# Patient Record
Sex: MALE | Race: BLACK OR AFRICAN AMERICAN | NOT HISPANIC OR LATINO | URBAN - METROPOLITAN AREA
[De-identification: names, ages, dates, MRNs, and addresses within clinical notes are randomized per-mention and may not be internally consistent; named-entity substitution may affect disease eponyms.]

---

## 2022-04-10 ENCOUNTER — HISTORICAL (OUTPATIENT)
Dept: ADMINISTRATIVE | Facility: HOSPITAL | Age: 42
End: 2022-04-10
Payer: MEDICAID

## 2022-04-19 DIAGNOSIS — K76.0 HEPATIC STEATOSIS: Primary | ICD-10-CM

## 2022-04-29 VITALS
WEIGHT: 264.13 LBS | OXYGEN SATURATION: 100 % | BODY MASS INDEX: 36.98 KG/M2 | DIASTOLIC BLOOD PRESSURE: 80 MMHG | SYSTOLIC BLOOD PRESSURE: 118 MMHG | HEIGHT: 71 IN

## 2022-05-10 ENCOUNTER — TELEPHONE (OUTPATIENT)
Dept: GASTROENTEROLOGY | Facility: CLINIC | Age: 42
End: 2022-05-10

## 2022-05-10 NOTE — TELEPHONE ENCOUNTER
----- Message from Rita Stone sent at 5/10/2022  2:13 PM CDT -----  Pt marc Dinora called to get ultrasound order sent to [facility] in [place] which is closer to where they live. [phone]

## 2022-05-11 ENCOUNTER — TELEPHONE (OUTPATIENT)
Dept: GASTROENTEROLOGY | Facility: CLINIC | Age: 42
End: 2022-05-11
Payer: MEDICAID

## 2022-08-01 ENCOUNTER — TELEPHONE (OUTPATIENT)
Dept: GASTROENTEROLOGY | Facility: CLINIC | Age: 42
End: 2022-08-01
Payer: MEDICAID

## 2022-08-01 NOTE — TELEPHONE ENCOUNTER
----- Message from Shruti Haynes sent at 7/29/2022  1:17 PM CDT -----  Patient's mother called regarding ultrasound orders to be faxed to [facility] in [facility].    Please refax orders to facility and mother also has questions regarding an appointment w/Tessy.    No appointment currently scheduled in Epic     Please advise     [phone]

## 2022-08-11 ENCOUNTER — OFFICE VISIT (OUTPATIENT)
Dept: GASTROENTEROLOGY | Facility: CLINIC | Age: 42
End: 2022-08-11
Payer: MEDICAID

## 2022-08-11 DIAGNOSIS — Z72.0 TOBACCO USER: ICD-10-CM

## 2022-08-11 DIAGNOSIS — K76.0 HEPATIC STEATOSIS: ICD-10-CM

## 2022-08-11 DIAGNOSIS — R79.89 ELEVATED LFTS: Primary | ICD-10-CM

## 2022-08-11 PROCEDURE — 4010F PR ACE/ARB THEARPY RXD/TAKEN: ICD-10-PCS | Mod: CPTII,95,, | Performed by: NURSE PRACTITIONER

## 2022-08-11 PROCEDURE — 4010F ACE/ARB THERAPY RXD/TAKEN: CPT | Mod: CPTII,95,, | Performed by: NURSE PRACTITIONER

## 2022-08-11 PROCEDURE — 1159F PR MEDICATION LIST DOCUMENTED IN MEDICAL RECORD: ICD-10-PCS | Mod: CPTII,95,, | Performed by: NURSE PRACTITIONER

## 2022-08-11 PROCEDURE — 1159F MED LIST DOCD IN RCRD: CPT | Mod: CPTII,95,, | Performed by: NURSE PRACTITIONER

## 2022-08-11 PROCEDURE — 1160F PR REVIEW ALL MEDS BY PRESCRIBER/CLIN PHARMACIST DOCUMENTED: ICD-10-PCS | Mod: CPTII,95,, | Performed by: NURSE PRACTITIONER

## 2022-08-11 PROCEDURE — 99214 OFFICE O/P EST MOD 30 MIN: CPT | Mod: 95,,, | Performed by: NURSE PRACTITIONER

## 2022-08-11 PROCEDURE — 99214 PR OFFICE/OUTPT VISIT, EST, LEVL IV, 30-39 MIN: ICD-10-PCS | Mod: 95,,, | Performed by: NURSE PRACTITIONER

## 2022-08-11 PROCEDURE — 1160F RVW MEDS BY RX/DR IN RCRD: CPT | Mod: CPTII,95,, | Performed by: NURSE PRACTITIONER

## 2022-08-11 RX ORDER — BUDESONIDE AND FORMOTEROL FUMARATE DIHYDRATE 160; 4.5 UG/1; UG/1
2 AEROSOL RESPIRATORY (INHALATION) EVERY 12 HOURS
COMMUNITY

## 2022-08-11 RX ORDER — OMEPRAZOLE 40 MG/1
40 CAPSULE, DELAYED RELEASE ORAL DAILY
COMMUNITY
Start: 2022-08-03

## 2022-08-11 RX ORDER — VITAMIN A ACETATE, .ALPHA.-TOCOPHEROL ACETATE, ASCORBIC ACID, THIAMINE MONONITRATE, RIBOFLAVIN, NIACINAMIDE, PYRIDOXINE HYDROCHLORIDE, BIOTIN, CALCIUM PANTOTHENATE, FOLIC ACID, CYANOCOBALAMIN, FLAVONE, FERROUS FUMARATE, CHROMIC CHLORIDE CR-51, MAGNESIUM OXIDE, MANGANESE GLUCONATE, CUPRIC OXIDE, SELENOMETHIONINE, AND ZINC OXIDE 2000; 30; 500; 20; 20; 100; 25; 150; 25; 1; 50; 50; 27; .1; 50; 5; 3; 50; 22.5 [IU]/1; [IU]/1; MG/1; MG/1; MG/1; MG/1; MG/1; UG/1; MG/1; MG/1; UG/1; MG/1; MG/1; MG/1; MG/1; MG/1; MG/1; UG/1; MG/1
TABLET, COATED ORAL
COMMUNITY
Start: 2022-03-19 | End: 2023-02-17

## 2022-08-11 RX ORDER — SIMVASTATIN 40 MG/1
40 TABLET, FILM COATED ORAL NIGHTLY
COMMUNITY

## 2022-08-11 RX ORDER — SOD CHLOR,BICARB/SQUEEZ BOTTLE
PACKET, WITH RINSE DEVICE NASAL
COMMUNITY
Start: 2022-03-15

## 2022-08-11 RX ORDER — NYSTATIN 100000 U/G
CREAM TOPICAL
COMMUNITY
End: 2023-02-17

## 2022-08-11 RX ORDER — MONTELUKAST SODIUM 10 MG/1
10 TABLET ORAL DAILY
COMMUNITY
Start: 2022-05-18 | End: 2023-02-17

## 2022-08-11 RX ORDER — FLUTICASONE PROPIONATE 50 MCG
2 SPRAY, SUSPENSION (ML) NASAL DAILY
COMMUNITY
Start: 2022-03-04

## 2022-08-11 RX ORDER — CALCIUM CITRATE/VITAMIN D3 200MG-6.25
TABLET ORAL
COMMUNITY
Start: 2022-02-21

## 2022-08-11 RX ORDER — TRIAMTERENE CAPSULES 50 MG/1
50 CAPSULE ORAL DAILY
COMMUNITY

## 2022-08-11 RX ORDER — PAROXETINE 10 MG/1
10 TABLET, FILM COATED ORAL DAILY
COMMUNITY
Start: 2022-01-28

## 2022-08-11 RX ORDER — LEVOCETIRIZINE DIHYDROCHLORIDE 5 MG/1
5 TABLET, FILM COATED ORAL NIGHTLY
COMMUNITY
Start: 2022-07-25

## 2022-08-11 RX ORDER — IBUPROFEN 800 MG/1
800 TABLET ORAL 3 TIMES DAILY PRN
COMMUNITY
Start: 2022-04-30

## 2022-08-11 NOTE — PROGRESS NOTES
Subjective:       Patient ID: Marvin Mckenna is a 41 y.o. male.    Chief Complaint: Hepatic Steatosis (No complaints)    This is a telemedicine note.     I have reviewed the patient's name and date of birth.  I have verbally reviewed the past medical history, active medication list, and allergies with the patient and verified with picture ID if applicable .  I have verified that this patient is in the state of [place].    Patient was treated using telemedicine, real-time audio (video unavailable).  I, distant provider, conducted the visit from [facility] in [place], [place].  The patient participated in the visit at a non-[facility] location. Patient was located at: home. I am licensed in the state where the patient stated they are located.  The patient stated that they understood and accepted the privacy and security risks to their information in their location.    Verbal consent to participate in interactive audio visit was obtained. This particular visit occurred during 7955-2766 COVID19 outbreak. I discussed with the patient regarding the nature of our telehealth visits, that:      - Our sessions are not being recorded and that personal health information is protected    - I would evaluate the patient and recommend diagnostics and treatments based on my assessment   - The team will provide follow up care in person if/when the patient needs it.    Total time spent with patient was 25 minutes, over 50% of which was used for education and counseling regarding medical conditions, current medications including risk/benefit and side effects/adverse events, over-the-counter medication uses/doses, home self-care and contact precautions, and red flags and indications for immediate medical attention.  The patient is receptive, expresses understanding and is agreeable to plan.  All questions answered and concerns addressed.      This 41-year-old  male with a history of tobacco  use, CVA, hyperlipidemia, depression, and asthma is being evaluated for a follow-up visit via telemedicine visit.  He was initially seen July 30, 2021, referred for fatty liver at that time.  He reported doing wellness labs with his PCP and being told that his liver enzymes were elevated.  Abdominal ultrasound June 17, 2021 revealed mild hepatomegaly with diffuse fatty infiltration of the liver and otherwise normal exam.  Acute viral hepatitis panel was negative June 25, 2021.    He reported feeling well and denied nocturnal symptoms, appetite changes, unintentional weight loss, fever, chills, nausea, vomiting, hematemesis, odynophagia, dysphagia, acid reflux, heartburn, early satiety, or abdominal pain.  Bowel habits were described as formed stools once daily without melena, hematochezia, fecal urgency, fecal incontinence, or pain with defecation.  He denied icterus, jaundice, pruritus, confusion, headaches, vision changes, cough, shortness of breath, chest pain, abdominal/lower extremity swelling, dark-colored urine, or pale-colored stools.    Laboratory results July 30, 2021 revealed CO2 30, glucose 115, and otherwise unremarkable CMP; iron level 57, transferrin 264, TIBC 297, iron saturation 19%, ferritin 276.05; ceruloplasmin 25.1; alpha-1 antitrypsin 133; WBC 12.2 and otherwise unremarkable CBC; PT 12.3, INR 0.93; negative GUSTAVO and dsDNA; F-actin 10; LKM Ab 1.2; TTG IgA <2; mitochondrial Ab <20.0.  Abdominal ultrasound December 30, 2021 revealed mild hepatomegaly with fatty infiltration.    He was last seen for a follow-up visit January 28, 2022.  He reported feeling well without any change in symptoms from previous office visit.     Laboratory results January 28, 2022 revealed AST 63, ALT 91, and otherwise unremarkable CMP; WBC 11.4 and otherwise unremarkable CBC; FibroSure F0 S3 N1.     He had an abdominal ultrasound August 5, 2022 with findings of mild hepatomegaly with fatty infiltration unchanged from  prior exam in December 2021 and otherwise normal sonogram of right upper quadrant.    Today, he reports feeling well without any change in symptoms from previous office visit.  He had recent lab work with his PCP which we will request.    He had an EGD in November 2020 with Dr. Covarrubias.  He had an EGD and colonoscopy approximately 3-4 years ago with Dr. Covarrubias with findings of esophagitis.  Colonoscopy was unremarkable per mother's report.  He takes aspirin 325 mg daily.  He smokes 10-20 cigarettes daily and denies alcohol use.  He smokes marijuana daily.  He denies a family history of IBD, colon polyps, or colon cancer.      Review of patient's allergies indicates:   Allergen Reactions    Sulfa (sulfonamide antibiotics)     Sulfamethoxazole-trimethoprim        History reviewed. No pertinent past medical history.    History reviewed. No pertinent surgical history.    Family History:   Family history is unknown by patient.    Social History:    reports that he has never smoked. He has never used smokeless tobacco. He reports that he does not drink alcohol and does not use drugs.    Review of Systems  Negative except as noted in the HPI.      Objective:      Physical Exam  Neurological:      Mental Status: He is alert and oriented to person, place, and time.   Psychiatric:         Mood and Affect: Mood normal.         Thought Content: Thought content normal.         Judgment: Judgment normal.         Home Medications:     Current Outpatient Medications   Medication Sig    aspirin 325 mg Cap Take 325 mg by mouth.    BACMIN 27 mg iron- 1 mg Tab TAKE 1 TABLET EVERY DAY BY ORAL ROUTE FOR 30 DAYS.    budesonide-formoterol 160-4.5 mcg (SYMBICORT) 160-4.5 mcg/actuation HFAA Symbicort 160 mcg-4.5 mcg/actuation HFA aerosol inhaler    fluticasone propionate (FLONASE) 50 mcg/actuation nasal spray SPRAY 2 SPRAYS EVERY DAY BY INTRANASAL ROUTE FOR 30 DAYS.    ibuprofen (ADVIL,MOTRIN) 800 MG tablet Take 800 mg by mouth 3  (three) times daily as needed.    levocetirizine (XYZAL) 5 MG tablet Take 5 mg by mouth nightly.    montelukast (SINGULAIR) 10 mg tablet Take 10 mg by mouth once daily.    NEILMED SINUS RINSE COMPLETE pkdv use as directed    nystatin (MYCOSTATIN) cream nystatin 100,000 unit/gram topical cream    omeprazole (PRILOSEC) 40 MG capsule Take 40 mg by mouth once daily.    paroxetine (PAXIL) 10 MG tablet paroxetine 10 mg tablet    simvastatin (ZOCOR) 40 MG tablet simvastatin 40 mg tablet   Take 1 tablet every day by oral route in the evening for 30 days.    triamterene (DYRENIUM) 50 MG Cap triamterene 50 mg capsule   TAKE 1 CAPSULE BY MOUTH EVERY DAY    TRUE METRIX GLUCOSE TEST STRIP Strp USE TO TEST BLOOD SUGAR DAILY     Assessment/Plan:     Problem List Items Addressed This Visit        GI    Elevated LFTs - Primary     Wellness labs with his PCP revealed elevated LFTs; requested records for further review.   Abdominal ultrasound June 17, 2021 revealed mild hepatomegaly with diffuse fatty infiltration of the liver and otherwise normal exam.   Acute viral hepatitis panel was negative June 25, 2021.   Recommend good control of comorbidities  Lifestyle and dietary modifications provided  Recommend cessation of tobacco and drug use  Laboratory results July 30, 2021 revealed CO2 30, glucose 115, and otherwise unremarkable CMP; iron level 57, transferrin 264, TIBC 297, iron saturation 19%, ferritin 276.05; ceruloplasmin 25.1; alpha-1 antitrypsin 133; WBC 12.2 and otherwise unremarkable CBC; PT 12.3, INR 0.93; negative GUSTAVO and dsDNA; F-actin 10; LKM Ab 1.2; TTG IgA <2; mitochondrial Ab <20.0.  Abdominal ultrasound December 30, 2021 revealed mild hepatomegaly with fatty infiltration.  Laboratory results January 28, 2022 revealed AST 63, ALT 91, and otherwise unremarkable CMP; WBC 11.4 and otherwise unremarkable CBC; FibroSure F0 S3 N1.   He had an abdominal ultrasound August 5, 2022 with findings of mild hepatomegaly  with fatty infiltration unchanged from prior exam in December 2021 and otherwise normal sonogram of right upper quadrant.  CBC, CMP; requested most recent laboratory results with PCP for further review  Abdominal ultrasound in 6 months  Call with updates  Follow-up telemedicine visit with NP in 6 months           Relevant Orders    CBC Auto Differential    Comprehensive Metabolic Panel    US Abdomen Limited    Hepatic steatosis     See above           Relevant Orders    CBC Auto Differential    Comprehensive Metabolic Panel    US Abdomen Limited       Other    Tobacco user     Recommend tobacco cessation

## 2022-08-11 NOTE — ASSESSMENT & PLAN NOTE
Wellness labs with his PCP revealed elevated LFTs; requested records for further review.   Abdominal ultrasound June 17, 2021 revealed mild hepatomegaly with diffuse fatty infiltration of the liver and otherwise normal exam.   Acute viral hepatitis panel was negative June 25, 2021.   Recommend good control of comorbidities  Lifestyle and dietary modifications provided  Recommend cessation of tobacco and drug use  Laboratory results July 30, 2021 revealed CO2 30, glucose 115, and otherwise unremarkable CMP; iron level 57, transferrin 264, TIBC 297, iron saturation 19%, ferritin 276.05; ceruloplasmin 25.1; alpha-1 antitrypsin 133; WBC 12.2 and otherwise unremarkable CBC; PT 12.3, INR 0.93; negative GUSTAVO and dsDNA; F-actin 10; LKM Ab 1.2; TTG IgA <2; mitochondrial Ab <20.0.  Abdominal ultrasound December 30, 2021 revealed mild hepatomegaly with fatty infiltration.  Laboratory results January 28, 2022 revealed AST 63, ALT 91, and otherwise unremarkable CMP; WBC 11.4 and otherwise unremarkable CBC; FibroSure F0 S3 N1.   He had an abdominal ultrasound August 5, 2022 with findings of mild hepatomegaly with fatty infiltration unchanged from prior exam in December 2021 and otherwise normal sonogram of right upper quadrant.  CBC, CMP; requested most recent laboratory results with PCP for further review  Abdominal ultrasound in 6 months  Call with updates  Follow-up telemedicine visit with NP in 6 months

## 2023-01-17 ENCOUNTER — TELEPHONE (OUTPATIENT)
Dept: GASTROENTEROLOGY | Facility: CLINIC | Age: 43
End: 2023-01-17
Payer: MEDICAID

## 2023-01-17 NOTE — TELEPHONE ENCOUNTER
Pt's mother notified that orders have been faxed. Next appt information confirmed, verbalized understanding.    ----- Message from Rita Stone sent at 1/13/2023  3:12 PM CST -----  Pt mom Dinora Anguiano called to send order for abdominal ultrasound to [facility].  Pt  mom # [phone]

## 2023-02-17 ENCOUNTER — OFFICE VISIT (OUTPATIENT)
Dept: GASTROENTEROLOGY | Facility: CLINIC | Age: 43
End: 2023-02-17
Payer: MEDICAID

## 2023-02-17 DIAGNOSIS — R79.89 ELEVATED LFTS: Primary | ICD-10-CM

## 2023-02-17 DIAGNOSIS — Z72.0 TOBACCO USER: ICD-10-CM

## 2023-02-17 DIAGNOSIS — K76.0 HEPATIC STEATOSIS: ICD-10-CM

## 2023-02-17 PROCEDURE — 99214 OFFICE O/P EST MOD 30 MIN: CPT | Mod: 95,,, | Performed by: NURSE PRACTITIONER

## 2023-02-17 PROCEDURE — 1159F MED LIST DOCD IN RCRD: CPT | Mod: CPTII,95,, | Performed by: NURSE PRACTITIONER

## 2023-02-17 PROCEDURE — 1159F PR MEDICATION LIST DOCUMENTED IN MEDICAL RECORD: ICD-10-PCS | Mod: CPTII,95,, | Performed by: NURSE PRACTITIONER

## 2023-02-17 PROCEDURE — 1160F RVW MEDS BY RX/DR IN RCRD: CPT | Mod: CPTII,95,, | Performed by: NURSE PRACTITIONER

## 2023-02-17 PROCEDURE — 1160F PR REVIEW ALL MEDS BY PRESCRIBER/CLIN PHARMACIST DOCUMENTED: ICD-10-PCS | Mod: CPTII,95,, | Performed by: NURSE PRACTITIONER

## 2023-02-17 PROCEDURE — 99214 PR OFFICE/OUTPT VISIT, EST, LEVL IV, 30-39 MIN: ICD-10-PCS | Mod: 95,,, | Performed by: NURSE PRACTITIONER

## 2023-02-17 RX ORDER — ALBUTEROL SULFATE 0.83 MG/ML
2.5 SOLUTION RESPIRATORY (INHALATION) 4 TIMES DAILY PRN
COMMUNITY
Start: 2023-02-03

## 2023-02-17 RX ORDER — METFORMIN HYDROCHLORIDE 500 MG/1
500 TABLET ORAL 2 TIMES DAILY
COMMUNITY
Start: 2023-02-10

## 2023-02-17 RX ORDER — CLONIDINE HYDROCHLORIDE 0.1 MG/1
TABLET ORAL
COMMUNITY

## 2023-02-17 NOTE — ASSESSMENT & PLAN NOTE
Wellness labs with his PCP revealed elevated LFTs; requested records for further review.   Abdominal ultrasound June 17, 2021 revealed mild hepatomegaly with diffuse fatty infiltration of the liver and otherwise normal exam.   Acute viral hepatitis panel was negative June 25, 2021.   Recommend good control of comorbidities  Lifestyle and dietary modifications provided  Recommend cessation of tobacco and drug use  Laboratory results July 30, 2021 revealed CO2 30, glucose 115, and otherwise unremarkable CMP; iron level 57, transferrin 264, TIBC 297, iron saturation 19%, ferritin 276.05; ceruloplasmin 25.1; alpha-1 antitrypsin 133; WBC 12.2 and otherwise unremarkable CBC; PT 12.3, INR 0.93; negative GUSTAVO and dsDNA; F-actin 10; LKM Ab 1.2; TTG IgA <2; mitochondrial Ab <20.0.  Abdominal ultrasound December 30, 2021 revealed mild hepatomegaly with fatty infiltration.  Laboratory results January 28, 2022 revealed AST 63, ALT 91, and otherwise unremarkable CMP; WBC 11.4 and otherwise unremarkable CBC; FibroSure F0 S3 N1.   He had an abdominal ultrasound August 5, 2022 with findings of mild hepatomegaly with fatty infiltration unchanged from prior exam in December 2021 and otherwise normal sonogram of right upper quadrant.  CBC, CMP, FibroSure  Abdominal ultrasound in 6 months  Requested most recent laboratory and abdominal ultrasound results for further review  Call with updates  Follow-up telemedicine visit with NP in 6 months

## 2023-02-17 NOTE — PROGRESS NOTES
Subjective:       Patient ID: Marvin Mckenna is a 42 y.o. male.    Chief Complaint: Elevated Hepatic Enzymes and Fatty Liver     This is a telemedicine note.      I have reviewed the patient's name and date of birth.  I have verbally reviewed the past medical history, active medication list, and allergies with the patient and verified with picture ID if applicable .  I have verified that this patient is in the state of [place].     Patient was treated using telemedicine, real-time audio (video unavailable).  I, distant provider, conducted the visit from [facility] in [place], [place].  The patient participated in the visit at a non-[facility] location. Patient was located at: home. I am licensed in the state where the patient stated they are located.  The patient stated that they understood and accepted the privacy and security risks to their information in their location.     Verbal consent to participate in interactive audio visit was obtained. This particular visit occurred during 5516-1245 COVID19 outbreak. I discussed with the patient regarding the nature of our telehealth visits, that:       - Our sessions are not being recorded and that personal health information is protected    - I would evaluate the patient and recommend diagnostics and treatments based on my assessment   - The team will provide follow up care in person if/when the patient needs it.     Total time spent with patient was 25 minutes, over 50% of which was used for education and counseling regarding medical conditions, current medications including risk/benefit and side effects/adverse events, over-the-counter medication uses/doses, home self-care and contact precautions, and red flags and indications for immediate medical attention.  The patient is receptive, expresses understanding and is agreeable to plan.  All questions answered and concerns addressed.        This 42-year-old  male with a  history of tobacco use, CVA, hyperlipidemia, depression, and asthma is being evaluated for a follow-up visit via telemedicine visit.  He was initially seen July 30, 2021, referred for fatty liver at that time.  He reported doing wellness labs with his PCP and being told that his liver enzymes were elevated.  Abdominal ultrasound June 17, 2021 revealed mild hepatomegaly with diffuse fatty infiltration of the liver and otherwise normal exam.  Acute viral hepatitis panel was negative June 25, 2021.     He reported feeling well and denied nocturnal symptoms, appetite changes, unintentional weight loss, fever, chills, nausea, vomiting, hematemesis, odynophagia, dysphagia, acid reflux, heartburn, early satiety, or abdominal pain.  Bowel habits were described as formed stools once daily without melena, hematochezia, fecal urgency, fecal incontinence, or pain with defecation.  He denied icterus, jaundice, pruritus, confusion, headaches, vision changes, cough, shortness of breath, chest pain, abdominal/lower extremity swelling, dark-colored urine, or pale-colored stools.     Laboratory results July 30, 2021 revealed CO2 30, glucose 115, and otherwise unremarkable CMP; iron level 57, transferrin 264, TIBC 297, iron saturation 19%, ferritin 276.05; ceruloplasmin 25.1; alpha-1 antitrypsin 133; WBC 12.2 and otherwise unremarkable CBC; PT 12.3, INR 0.93; negative GUSTAVO and dsDNA; F-actin 10; LKM Ab 1.2; TTG IgA <2; mitochondrial Ab <20.0.  Abdominal ultrasound December 30, 2021 revealed mild hepatomegaly with fatty infiltration.     He was seen for a follow-up visit January 28, 2022.  He reported feeling well without any change in symptoms from previous office visit.      Laboratory results January 28, 2022 revealed AST 63, ALT 91, and otherwise unremarkable CMP; WBC 11.4 and otherwise unremarkable CBC; FibroSure F0 S3 N1.      He had an abdominal ultrasound August 5, 2022 with findings of mild hepatomegaly with fatty infiltration  unchanged from prior exam in December 2021 and otherwise normal sonogram of right upper quadrant.     He was last evaluated via telemedicine visit August 11, 2022 and reported feeling well without any change in symptoms from previous office visit.      Today, he is being evaluated for a follow-up visit via telemedicine visit and reports continuing to feel well without any change in symptoms from previous telemedicine visit.  His mother reports that he had an abdominal ultrasound completed at [facility] February 3, 2023, as well as recent lab work which we will request for further review.     He had an EGD in November 2020 with Dr. Covarrubias.  He had an EGD and colonoscopy approximately 3-4 years ago with Dr. Covarrubias with findings of esophagitis.  Colonoscopy was unremarkable per mother's report.  He takes aspirin 325 mg daily.  He smokes 10-20 cigarettes daily and denies alcohol use.  He smokes marijuana daily.  He denies a family history of IBD, colon polyps, or colon cancer.    Review of patient's allergies indicates:   Allergen Reactions    Sulfa (sulfonamide antibiotics)     Sulfamethoxazole-trimethoprim      Past Medical History:   Diagnosis Date    GERD (gastroesophageal reflux disease) 01/10/2018    Stroke     Unspecified chronic bronchitis      History reviewed. No pertinent surgical history.    Family History:   Family history is unknown by patient.    Social History:    reports that he has been smoking cigarettes. He started smoking about 21 years ago. He has a 10.00 pack-year smoking history. He has never used smokeless tobacco. He reports that he does not currently use alcohol. He reports that he does not currently use drugs after having used the following drugs: Marijuana.    Review of Systems  Negative except as noted in the HPI.      Objective:      Physical Exam  Neurological:      Mental Status: He is alert and oriented to person, place, and time.   Psychiatric:         Mood and Affect:  Mood normal.         Thought Content: Thought content normal.         Judgment: Judgment normal.       Home Medications:     Current Outpatient Medications   Medication Sig    albuterol (PROVENTIL) 2.5 mg /3 mL (0.083 %) nebulizer solution Take 2.5 mg by nebulization 4 (four) times daily as needed.    budesonide-formoterol 160-4.5 mcg (SYMBICORT) 160-4.5 mcg/actuation HFAA Inhale 2 puffs into the lungs every 12 (twelve) hours.    cloNIDine (CATAPRES) 0.1 MG tablet clonidine HCl 0.1 mg tablet   TAKE ONE TABLET AS NEEDED FOR BLOOD PRESSURE ABOVE 160/100    fluticasone propionate (FLONASE) 50 mcg/actuation nasal spray 2 sprays by Each Nostril route once daily.    ibuprofen (ADVIL,MOTRIN) 800 MG tablet Take 800 mg by mouth 3 (three) times daily as needed.    levocetirizine (XYZAL) 5 MG tablet Take 5 mg by mouth nightly.    metFORMIN (GLUCOPHAGE) 500 MG tablet Take 500 mg by mouth 2 (two) times daily.    omeprazole (PRILOSEC) 40 MG capsule Take 40 mg by mouth once daily.    paroxetine (PAXIL) 10 MG tablet Take 10 mg by mouth once daily.    simvastatin (ZOCOR) 40 MG tablet Take 40 mg by mouth every evening.    triamterene (DYRENIUM) 50 MG Cap Take 50 mg by mouth once daily.    TRUE METRIX GLUCOSE TEST STRIP Strp USE TO TEST BLOOD SUGAR DAILY    NEILMED SINUS RINSE COMPLETE pkdv use as directed     Assessment/Plan:     Problem List Items Addressed This Visit          GI    Elevated LFTs - Primary     Wellness labs with his PCP revealed elevated LFTs; requested records for further review.   Abdominal ultrasound June 17, 2021 revealed mild hepatomegaly with diffuse fatty infiltration of the liver and otherwise normal exam.   Acute viral hepatitis panel was negative June 25, 2021.   Recommend good control of comorbidities  Lifestyle and dietary modifications provided  Recommend cessation of tobacco and drug use  Laboratory results July 30, 2021 revealed CO2 30, glucose 115, and otherwise unremarkable CMP; iron level 57,  transferrin 264, TIBC 297, iron saturation 19%, ferritin 276.05; ceruloplasmin 25.1; alpha-1 antitrypsin 133; WBC 12.2 and otherwise unremarkable CBC; PT 12.3, INR 0.93; negative GUSTAVO and dsDNA; F-actin 10; LKM Ab 1.2; TTG IgA <2; mitochondrial Ab <20.0.  Abdominal ultrasound December 30, 2021 revealed mild hepatomegaly with fatty infiltration.  Laboratory results January 28, 2022 revealed AST 63, ALT 91, and otherwise unremarkable CMP; WBC 11.4 and otherwise unremarkable CBC; FibroSure F0 S3 N1.   He had an abdominal ultrasound August 5, 2022 with findings of mild hepatomegaly with fatty infiltration unchanged from prior exam in December 2021 and otherwise normal sonogram of right upper quadrant.  CBC, CMP, FibroSure  Abdominal ultrasound in 6 months  Requested most recent laboratory and abdominal ultrasound results for further review  Call with updates  Follow-up telemedicine visit with NP in 6 months         Relevant Orders    CBC Auto Differential    Comprehensive Metabolic Panel    LAZO Fibrosure    Hepatic steatosis     See above         Relevant Orders    CBC Auto Differential    Comprehensive Metabolic Panel    LAZO Fibrosure       Other    Tobacco user     Recommend tobacco cessation.         Relevant Orders    Ambulatory referral/consult to Smoking Cessation Program

## 2023-04-06 ENCOUNTER — TELEPHONE (OUTPATIENT)
Dept: GASTROENTEROLOGY | Facility: CLINIC | Age: 43
End: 2023-04-06
Payer: MEDICAID

## 2023-05-10 ENCOUNTER — TELEPHONE (OUTPATIENT)
Dept: GASTROENTEROLOGY | Facility: CLINIC | Age: 43
End: 2023-05-10
Payer: MEDICAID

## 2023-05-10 NOTE — TELEPHONE ENCOUNTER
----- Message from Nadja Bowling sent at 5/10/2023 11:47 AM CDT -----  Regarding: Lab orders to [place]  Please send pt's outstanding lab orders to [place]. Contact info is [phone] and pt's mother would like a call when labs are sent.          Thank You  Soledad KILLIAN

## 2023-08-15 ENCOUNTER — OFFICE VISIT (OUTPATIENT)
Dept: GASTROENTEROLOGY | Facility: CLINIC | Age: 43
End: 2023-08-15
Payer: MEDICAID

## 2023-08-15 DIAGNOSIS — K76.0 HEPATIC STEATOSIS: ICD-10-CM

## 2023-08-15 DIAGNOSIS — R79.89 ELEVATED LFTS: Primary | ICD-10-CM

## 2023-08-15 DIAGNOSIS — Z72.0 TOBACCO USER: ICD-10-CM

## 2023-08-15 PROCEDURE — 1159F MED LIST DOCD IN RCRD: CPT | Mod: 95,CPTII,, | Performed by: NURSE PRACTITIONER

## 2023-08-15 PROCEDURE — 99443 PR PHYSICIAN TELEPHONE EVALUATION 21-30 MIN: CPT | Mod: 95,,, | Performed by: NURSE PRACTITIONER

## 2023-08-15 PROCEDURE — 1160F PR REVIEW ALL MEDS BY PRESCRIBER/CLIN PHARMACIST DOCUMENTED: ICD-10-PCS | Mod: 95,CPTII,, | Performed by: NURSE PRACTITIONER

## 2023-08-15 PROCEDURE — 1159F PR MEDICATION LIST DOCUMENTED IN MEDICAL RECORD: ICD-10-PCS | Mod: 95,CPTII,, | Performed by: NURSE PRACTITIONER

## 2023-08-15 PROCEDURE — 1160F RVW MEDS BY RX/DR IN RCRD: CPT | Mod: 95,CPTII,, | Performed by: NURSE PRACTITIONER

## 2023-08-15 PROCEDURE — 99443 PR PHYSICIAN TELEPHONE EVALUATION 21-30 MIN: ICD-10-PCS | Mod: 95,,, | Performed by: NURSE PRACTITIONER

## 2023-08-15 RX ORDER — BLOOD-GLUCOSE METER
EACH MISCELLANEOUS
COMMUNITY
Start: 2023-02-20

## 2023-08-15 RX ORDER — DOXYCYCLINE 100 MG/1
100 CAPSULE ORAL 2 TIMES DAILY
COMMUNITY
Start: 2023-08-09

## 2023-08-15 NOTE — ASSESSMENT & PLAN NOTE
Wellness labs with his PCP revealed elevated LFTs.   Abdominal ultrasound June 17, 2021 revealed mild hepatomegaly with diffuse fatty infiltration of the liver and otherwise normal exam.   Acute viral hepatitis panel was negative June 25, 2021.   Recommend good control of comorbidities  Lifestyle and dietary modifications provided  Recommend cessation of tobacco and drug use  Laboratory results July 30, 2021 revealed CO2 30, glucose 115, and otherwise unremarkable CMP; iron level 57, transferrin 264, TIBC 297, iron saturation 19%, ferritin 276.05; ceruloplasmin 25.1; alpha-1 antitrypsin 133; WBC 12.2 and otherwise unremarkable CBC; PT 12.3, INR 0.93; negative GUSTAVO and dsDNA; F-actin 10; LKM Ab 1.2; TTG IgA <2; mitochondrial Ab <20.0.  Abdominal ultrasound December 30, 2021 revealed mild hepatomegaly with fatty infiltration.  Laboratory results January 28, 2022 revealed AST 63, ALT 91, and otherwise unremarkable CMP; WBC 11.4 and otherwise unremarkable CBC; FibroSure F0 S3 N1.   He had an abdominal ultrasound August 5, 2022 with findings of mild hepatomegaly with fatty infiltration unchanged from prior exam in December 2021 and otherwise normal sonogram of right upper quadrant.  Requested most recent laboratory and abdominal ultrasound results for further review  Continue follow-up with PCP for continued lab monitoring  Call with updates  Follow-up clinic visit with NP PRN

## 2023-08-15 NOTE — PROGRESS NOTES
Subjective:       Patient ID: Marvin Mckenna is a 42 y.o. male.    Chief Complaint: Elevated Hepatic Enzymes    This is a telemedicine note.      I have reviewed the patient's name and date of birth.  I have verbally reviewed the past medical history, active medication list, and allergies with the patient and verified with picture ID if applicable .  I have verified that this patient is in the state of [place].     Patient was treated using telemedicine, real-time audio (video unavailable as poor connection on patient end).  I, distant provider, conducted the visit from [facility] in [place], [place].  The patient participated in the visit at a non-[facility] location. Patient was located at: home. I am licensed in the state where the patient stated they are located.  The patient stated that they understood and accepted the privacy and security risks to their information in their location.     Verbal consent to participate in interactive audio visit was obtained. This particular visit occurred during 1419-1322 COVID19 outbreak. I discussed with the patient regarding the nature of our telehealth visits, that:       - Our sessions are not being recorded and that personal health information is protected    - I would evaluate the patient and recommend diagnostics and treatments based on my assessment   - The team will provide follow up care in person if/when the patient needs it.     Total time spent with patient was 25 minutes, over 50% of which was used for education and counseling regarding medical conditions, current medications including risk/benefit and side effects/adverse events, over-the-counter medication uses/doses, home self-care and contact precautions, and red flags and indications for immediate medical attention.  The patient is receptive, expresses understanding and is agreeable to plan.  All questions answered and concerns addressed.        This 42-year-old   male with a history of tobacco use, CVA, hyperlipidemia, depression, and asthma is being evaluated for a follow-up visit via telemedicine visit.  He was initially seen July 30, 2021, referred for fatty liver at that time.  He reported doing wellness labs with his PCP and being told that his liver enzymes were elevated.  Abdominal ultrasound June 17, 2021 revealed mild hepatomegaly with diffuse fatty infiltration of the liver and otherwise normal exam.  Acute viral hepatitis panel was negative June 25, 2021.     He reported feeling well and denied nocturnal symptoms, appetite changes, unintentional weight loss, fever, chills, nausea, vomiting, hematemesis, odynophagia, dysphagia, acid reflux, heartburn, early satiety, or abdominal pain.  Bowel habits were described as formed stools once daily without melena, hematochezia, fecal urgency, fecal incontinence, or pain with defecation.  He denied icterus, jaundice, pruritus, confusion, headaches, vision changes, cough, shortness of breath, chest pain, abdominal/lower extremity swelling, dark-colored urine, or pale-colored stools.     Laboratory results July 30, 2021 revealed CO2 30, glucose 115, and otherwise unremarkable CMP; iron level 57, transferrin 264, TIBC 297, iron saturation 19%, ferritin 276.05; ceruloplasmin 25.1; alpha-1 antitrypsin 133; WBC 12.2 and otherwise unremarkable CBC; PT 12.3, INR 0.93; negative GUSTAVO and dsDNA; F-actin 10; LKM Ab 1.2; TTG IgA <2; mitochondrial Ab <20.0.  Abdominal ultrasound December 30, 2021 revealed mild hepatomegaly with fatty infiltration.     He was seen for a follow-up visit January 28, 2022.  He reported feeling well without any change in symptoms from previous office visit.      Laboratory results January 28, 2022 revealed AST 63, ALT 91, and otherwise unremarkable CMP; WBC 11.4 and otherwise unremarkable CBC; FibroSure F0 S3 N1.      He had an abdominal ultrasound August 5, 2022 with findings of mild hepatomegaly with fatty  infiltration unchanged from prior exam in December 2021 and otherwise normal sonogram of right upper quadrant.     He was evaluated via telemedicine visit August 11, 2022 and reported feeling well without any change in symptoms from previous office visit.       He was evaluated for a follow-up visit via telemedicine visit and reported continuing to feel well without any change in symptoms from previous telemedicine visit.  His mother reported that he had an abdominal ultrasound completed at [facility] February 3, 2023, as well as recent lab work which was requested for further review.    Today, he is being evaluated via telemedicine visit and reports continuing to feel well without any change in symptoms from previous office visit.     He had an EGD in November 2020 with Dr. Covarrubias.  He had an EGD and colonoscopy approximately 3-4 years ago with Dr. Covarrubias with findings of esophagitis.  Colonoscopy was unremarkable per mother's report.  He takes aspirin 325 mg daily.  He smokes 10-20 cigarettes daily and denies alcohol use.  He smokes marijuana daily.  He denies a family history of IBD, colon polyps, or colon cancer.      Review of patient's allergies indicates:   Allergen Reactions    Sulfa (sulfonamide antibiotics)     Sulfamethoxazole-trimethoprim      Past Medical History:   Diagnosis Date    GERD (gastroesophageal reflux disease) 01/10/2018    Liver disease August 1, 2021    Fatty liver    Stroke     Type 2 diabetes mellitus without complications     Unspecified chronic bronchitis      History reviewed. No pertinent surgical history.    Family History:   Family history is unknown by patient.    Social History:    reports that he has been smoking cigarettes. He started smoking about 21 years ago. He has a 10.8 pack-year smoking history. He has never used smokeless tobacco. He reports that he does not currently use alcohol. He reports that he does not currently use drugs after having used the  following drugs: Marijuana.    Review of Systems  Negative except as noted in the HPI.      Objective:      Physical Exam  Neurological:      Mental Status: He is alert and oriented to person, place, and time.   Psychiatric:         Mood and Affect: Mood normal.         Thought Content: Thought content normal.         Judgment: Judgment normal.         Home Medications:     Current Outpatient Medications   Medication Sig    albuterol (PROVENTIL) 2.5 mg /3 mL (0.083 %) nebulizer solution Take 2.5 mg by nebulization 4 (four) times daily as needed.    budesonide-formoterol 160-4.5 mcg (SYMBICORT) 160-4.5 mcg/actuation HFAA Inhale 2 puffs into the lungs every 12 (twelve) hours.    cloNIDine (CATAPRES) 0.1 MG tablet clonidine HCl 0.1 mg tablet   TAKE ONE TABLET AS NEEDED FOR BLOOD PRESSURE ABOVE 160/100    doxycycline (VIBRAMYCIN) 100 MG Cap Take 100 mg by mouth 2 (two) times daily.    fluticasone propionate (FLONASE) 50 mcg/actuation nasal spray 2 sprays by Each Nostril route once daily.    ibuprofen (ADVIL,MOTRIN) 800 MG tablet Take 800 mg by mouth 3 (three) times daily as needed.    levocetirizine (XYZAL) 5 MG tablet Take 5 mg by mouth nightly.    metFORMIN (GLUCOPHAGE) 500 MG tablet Take 500 mg by mouth 2 (two) times daily.    NEILMED SINUS RINSE COMPLETE pkdv use as directed    omeprazole (PRILOSEC) 40 MG capsule Take 40 mg by mouth once daily.    paroxetine (PAXIL) 10 MG tablet Take 10 mg by mouth once daily.    simvastatin (ZOCOR) 40 MG tablet Take 40 mg by mouth every evening.    triamterene (DYRENIUM) 50 MG Cap Take 50 mg by mouth once daily.    TRUE METRIX GLUCOSE METER Misc USE TO TEST BLOOD SUGAR DAILY    TRUE METRIX GLUCOSE TEST STRIP Strp USE TO TEST BLOOD SUGAR DAILY     Assessment/Plan:     Problem List Items Addressed This Visit          GI    Elevated LFTs - Primary     Wellness labs with his PCP revealed elevated LFTs.   Abdominal ultrasound June 17, 2021 revealed mild hepatomegaly with diffuse fatty  infiltration of the liver and otherwise normal exam.   Acute viral hepatitis panel was negative June 25, 2021.   Recommend good control of comorbidities  Lifestyle and dietary modifications provided  Recommend cessation of tobacco and drug use  Laboratory results July 30, 2021 revealed CO2 30, glucose 115, and otherwise unremarkable CMP; iron level 57, transferrin 264, TIBC 297, iron saturation 19%, ferritin 276.05; ceruloplasmin 25.1; alpha-1 antitrypsin 133; WBC 12.2 and otherwise unremarkable CBC; PT 12.3, INR 0.93; negative GUSTAVO and dsDNA; F-actin 10; LKM Ab 1.2; TTG IgA <2; mitochondrial Ab <20.0.  Abdominal ultrasound December 30, 2021 revealed mild hepatomegaly with fatty infiltration.  Laboratory results January 28, 2022 revealed AST 63, ALT 91, and otherwise unremarkable CMP; WBC 11.4 and otherwise unremarkable CBC; FibroSure F0 S3 N1.   He had an abdominal ultrasound August 5, 2022 with findings of mild hepatomegaly with fatty infiltration unchanged from prior exam in December 2021 and otherwise normal sonogram of right upper quadrant.  Requested most recent laboratory and abdominal ultrasound results for further review  Continue follow-up with PCP for continued lab monitoring  Call with updates  Follow-up clinic visit with NP PRN         Hepatic steatosis     See above            Other    Tobacco user     Recommend tobacco cessation.

## 2024-02-26 ENCOUNTER — TELEPHONE (OUTPATIENT)
Dept: GASTROENTEROLOGY | Facility: CLINIC | Age: 44
End: 2024-02-26
Payer: MEDICAID

## 2024-02-26 NOTE — TELEPHONE ENCOUNTER
Spoke with pt mother. Advised per Tessy's last office note pt was to be monitored by PCP and return to GI clinic PRN. Verbalized understanding.

## 2024-02-26 NOTE — TELEPHONE ENCOUNTER
----- Message from Ana Anthony sent at 2/26/2024 12:55 PM CST -----  Regarding: Appointment  Pt mother Dinora called to see if anything was scheduled for pt in the upcoming months. Did advise the mother we did not having anything scheduled for him.Mother advised pt is doing fine however she was under the impression that he was to be seen every 6 months. Dinora can be contacted at .[phone]